# Patient Record
Sex: MALE | Race: WHITE | NOT HISPANIC OR LATINO | Employment: STUDENT | URBAN - METROPOLITAN AREA
[De-identification: names, ages, dates, MRNs, and addresses within clinical notes are randomized per-mention and may not be internally consistent; named-entity substitution may affect disease eponyms.]

---

## 2024-02-02 ENCOUNTER — OFFICE VISIT (OUTPATIENT)
Dept: FAMILY MEDICINE CLINIC | Facility: CLINIC | Age: 19
End: 2024-02-02
Payer: COMMERCIAL

## 2024-02-02 VITALS
RESPIRATION RATE: 16 BRPM | BODY MASS INDEX: 17.86 KG/M2 | TEMPERATURE: 98.4 F | HEIGHT: 64 IN | WEIGHT: 104.6 LBS | HEART RATE: 83 BPM | SYSTOLIC BLOOD PRESSURE: 88 MMHG | OXYGEN SATURATION: 99 % | DIASTOLIC BLOOD PRESSURE: 64 MMHG

## 2024-02-02 DIAGNOSIS — N50.812 LEFT TESTICULAR PAIN: Primary | ICD-10-CM

## 2024-02-02 PROBLEM — E88.01 ALPHA-1-ANTITRYPSIN DEFICIENCY: Status: ACTIVE | Noted: 2024-02-02

## 2024-02-02 PROCEDURE — 99203 OFFICE O/P NEW LOW 30 MIN: CPT | Performed by: FAMILY MEDICINE

## 2024-02-02 NOTE — PROGRESS NOTES
"Chief Complaint   Patient presents with    [facility]     New patient-lump in scrotum pain gone       Subjective      Sly CHRISTIAN is a 18 y.o. who presents for left testicular pain that at 1 point radiated down the medial thigh.  Pain was present 5 days ago.  At that time patient began taking some cephalexin that his mother had from a prior prescription.  By the time of his visit today pain is improved.  Patient notes discomfort in the posterior aspect of the left testicle.    In 2020 patient had a similar event occur that led to an ER visit.  At that time he had right-sided testicular and groin pain.  Patient saw his pediatrician at that time.  Note indicates there was suspicion of inflammation.  Patient had subsequent ER visit and likely ultrasound of the testicle.  Parents report no specific diagnosis was given but they were led to believe that everything was okay.  Patient denies fevers or chills.  In private he reports masturbating approximately once per week.  He denies any recent trauma    The following portions of the patient's history were reviewed and updated as appropriate: allergies, current medications, past family history, past medical history, past social history, past surgical history, and problem list.    Review of Systems    Objective   Vital Signs:  BP (!) 88/64   Pulse 83   Temp 98.4 °F (36.9 °C)   Resp 16   Ht 162.6 cm (64\")   Wt 47.4 kg (104 lb 9.6 oz)   SpO2 99%   BMI 17.95 kg/m²     Pediatric BMI = <1 %ile (Z= -2.33) based on CDC (Boys, 2-20 Years) BMI-for-age based on BMI available as of 2/2/2024..         Physical Exam  Vitals reviewed.   Constitutional:       General: He is not in acute distress.     Appearance: Normal appearance. He is not ill-appearing.   Cardiovascular:      Rate and Rhythm: Normal rate and regular rhythm.      Pulses: Normal pulses.      Heart sounds: Normal heart sounds.   Pulmonary:      Effort: Pulmonary effort is normal.      Breath sounds: Normal " breath sounds.   Abdominal:      Hernia: There is no hernia in the left inguinal area.   Genitourinary:     Penis: Normal.       Testes: Normal.         Left: Mass, tenderness, swelling, testicular hydrocele or varicocele not present.      Epididymis:      Left: Tenderness present. No mass.      Comments: Patient voices mild discomfort with palpation of the left epididymis  Musculoskeletal:      Left upper leg: Normal.      Left lower leg: Normal.   Neurological:      Mental Status: He is alert.          Result Review   The following data was reviewed by: Devendra Wright MD on 02/02/2024:    Data reviewed : Pediatric note from August 2020               Assessment and Plan  Diagnoses and all orders for this visit:    1. Left testicular pain (Primary)  Comments:  suspect epidydimitis, noninfectious. Use NSAID prn and scrotal support prn. Obtain old records    Patient and parents were reassured there was no palpable abnormality.  We will obtain records of his testicular ultrasound in 2020        Follow Up  No follow-ups on file.  Patient was given instructions and counseling regarding his condition or for health maintenance advice. Please see specific information pulled into the AVS if appropriate.

## 2024-03-04 ENCOUNTER — OFFICE VISIT (OUTPATIENT)
Dept: FAMILY MEDICINE CLINIC | Facility: CLINIC | Age: 19
End: 2024-03-04
Payer: COMMERCIAL

## 2024-03-04 VITALS
TEMPERATURE: 98.6 F | BODY MASS INDEX: 18.27 KG/M2 | HEIGHT: 64 IN | OXYGEN SATURATION: 95 % | WEIGHT: 107 LBS | HEART RATE: 60 BPM | RESPIRATION RATE: 14 BRPM | DIASTOLIC BLOOD PRESSURE: 68 MMHG | SYSTOLIC BLOOD PRESSURE: 94 MMHG

## 2024-03-04 DIAGNOSIS — Z00.00 ANNUAL PHYSICAL EXAM: Primary | ICD-10-CM

## 2024-03-04 DIAGNOSIS — E88.01 ALPHA-1-ANTITRYPSIN DEFICIENCY: ICD-10-CM

## 2024-03-04 PROCEDURE — 99395 PREV VISIT EST AGE 18-39: CPT | Performed by: FAMILY MEDICINE

## 2024-03-04 NOTE — PROGRESS NOTES
"Chief Complaint   Patient presents with    Annual Exam     Pt is fasting    Anxiety       Subjective      Sly CHRISTIAN is a 18 y.o. who presents for annual physical    Sleep. 7 or more hours per night. Feels rested. Drinks caffeinated beverages.     Physical Activity. Gets outside some. No formal exercise.     Diet. Lots of meat. Drinks sweat tea and green tea. No sports drinks or fruits juices.    Patient is accompanied by his father who also brings up anxiety the patient has had.  Patient feels like his anxiety and nervousness are much less after his visit to the office regarding his testicular discomfort.  He does still report some nervousness in social situations but this does not interfere with quality of life or relationships.    Past medical history is significant for alpha-1 antitrypsin deficiency.    The following portions of the patient's history were reviewed and updated as appropriate: allergies, current medications, past family history, past medical history, past social history, past surgical history, and problem list.    Review of Systems   Constitutional: Negative.    HENT: Negative.     Eyes: Negative.    Respiratory: Negative.     Cardiovascular: Negative.    Gastrointestinal: Negative.    Endocrine: Negative.    Genitourinary: Negative.    Musculoskeletal: Negative.    Skin: Negative.    Allergic/Immunologic: Negative.    Neurological: Negative.    Hematological: Negative.    Psychiatric/Behavioral:  The patient is nervous/anxious.        Objective   Vital Signs:  BP 94/68   Pulse 60   Temp 98.6 °F (37 °C)   Resp 14   Ht 162.6 cm (64\")   Wt 48.5 kg (107 lb)   SpO2 95%   BMI 18.37 kg/m²     Pediatric BMI = 2 %ile (Z= -2.08) based on CDC (Boys, 2-20 Years) BMI-for-age based on BMI available as of 3/4/2024..         Physical Exam  Constitutional:       General: He is not in acute distress.     Appearance: Normal appearance. He is not ill-appearing.   HENT:      Head: Normocephalic and " atraumatic.      Right Ear: Tympanic membrane and ear canal normal.      Left Ear: Tympanic membrane and ear canal normal.      Nose: Nose normal.      Mouth/Throat:      Mouth: Mucous membranes are moist.      Pharynx: Oropharynx is clear. No posterior oropharyngeal erythema.   Eyes:      Extraocular Movements: Extraocular movements intact.      Conjunctiva/sclera: Conjunctivae normal.      Pupils: Pupils are equal, round, and reactive to light.   Cardiovascular:      Rate and Rhythm: Normal rate and regular rhythm.      Pulses: Normal pulses.      Heart sounds: Normal heart sounds.   Pulmonary:      Effort: Pulmonary effort is normal. No respiratory distress.      Breath sounds: Normal breath sounds.   Abdominal:      General: Abdomen is flat. Bowel sounds are normal.      Palpations: Abdomen is soft.      Tenderness: There is no abdominal tenderness.   Musculoskeletal:         General: Normal range of motion.      Cervical back: Normal range of motion and neck supple.   Lymphadenopathy:      Cervical: No cervical adenopathy.   Skin:     General: Skin is warm and dry.      Capillary Refill: Capillary refill takes less than 2 seconds.   Neurological:      General: No focal deficit present.      Mental Status: He is alert and oriented to person, place, and time. Mental status is at baseline.      Cranial Nerves: No cranial nerve deficit.      Sensory: No sensory deficit.      Motor: No weakness.   Psychiatric:         Mood and Affect: Mood normal.         Behavior: Behavior normal.         Thought Content: Thought content normal.         Judgment: Judgment normal.          Result Review                     Assessment and Plan  Diagnoses and all orders for this visit:    1. Annual physical exam (Primary)    2. Alpha-1-antitrypsin deficiency  -     CBC & Differential  -     Hepatic Function Panel    Plan  1.  Patient is an average physical health for age.  I did encourage more physical activity in the form of walking  and other weightbearing exercises  2.  Surveillance labs of alpha-1 antitrypsin deficiency been ordered  3.  Regarding anxiety if he notices periods of nervousness I have advised him to monitor his caffeine intake as this could be a significant source of feeling on the edge.  Should symptoms worsen he can return to the office  4.  We also discussed dietary modifications.  Currently the patient can essentially eat what ever he wants without weight gain.  I did emphasize willingness to try new foods, especially foods that are considered good for you such as specialist        Follow Up  Return in about 1 year (around 3/4/2025) for Annual.  Patient was given instructions and counseling regarding his condition or for health maintenance advice. Please see specific information pulled into the AVS if appropriate.

## 2024-03-05 LAB
ALBUMIN SERPL-MCNC: 4.7 G/DL (ref 3.5–5.2)
ALP SERPL-CCNC: 81 U/L (ref 39–117)
ALT SERPL-CCNC: 11 U/L (ref 1–41)
AST SERPL-CCNC: 13 U/L (ref 1–40)
BASOPHILS # BLD AUTO: 0.03 10*3/MM3 (ref 0–0.2)
BASOPHILS NFR BLD AUTO: 0.4 % (ref 0–1.5)
BILIRUB DIRECT SERPL-MCNC: 0.2 MG/DL (ref 0–0.3)
BILIRUB SERPL-MCNC: 0.6 MG/DL (ref 0–1.2)
EOSINOPHIL # BLD AUTO: 0.48 10*3/MM3 (ref 0–0.4)
EOSINOPHIL NFR BLD AUTO: 5.9 % (ref 0.3–6.2)
ERYTHROCYTE [DISTWIDTH] IN BLOOD BY AUTOMATED COUNT: 12.6 % (ref 12.3–15.4)
HCT VFR BLD AUTO: 47.1 % (ref 37.5–51)
HGB BLD-MCNC: 15.5 G/DL (ref 13–17.7)
IMM GRANULOCYTES # BLD AUTO: 0.02 10*3/MM3 (ref 0–0.05)
IMM GRANULOCYTES NFR BLD AUTO: 0.2 % (ref 0–0.5)
LYMPHOCYTES # BLD AUTO: 2.3 10*3/MM3 (ref 0.7–3.1)
LYMPHOCYTES NFR BLD AUTO: 28 % (ref 19.6–45.3)
MCH RBC QN AUTO: 27.6 PG (ref 26.6–33)
MCHC RBC AUTO-ENTMCNC: 32.9 G/DL (ref 31.5–35.7)
MCV RBC AUTO: 83.8 FL (ref 79–97)
MONOCYTES # BLD AUTO: 0.82 10*3/MM3 (ref 0.1–0.9)
MONOCYTES NFR BLD AUTO: 10 % (ref 5–12)
NEUTROPHILS # BLD AUTO: 4.55 10*3/MM3 (ref 1.7–7)
NEUTROPHILS NFR BLD AUTO: 55.5 % (ref 42.7–76)
NRBC BLD AUTO-RTO: 0 /100 WBC (ref 0–0.2)
PLATELET # BLD AUTO: 259 10*3/MM3 (ref 140–450)
PROT SERPL-MCNC: 7 G/DL (ref 6–8.5)
RBC # BLD AUTO: 5.62 10*6/MM3 (ref 4.14–5.8)
WBC # BLD AUTO: 8.2 10*3/MM3 (ref 3.4–10.8)

## 2024-09-16 ENCOUNTER — TELEPHONE (OUTPATIENT)
Dept: FAMILY MEDICINE CLINIC | Facility: CLINIC | Age: 19
End: 2024-09-16
Payer: COMMERCIAL